# Patient Record
Sex: MALE | Race: WHITE | Employment: UNEMPLOYED | URBAN - METROPOLITAN AREA
[De-identification: names, ages, dates, MRNs, and addresses within clinical notes are randomized per-mention and may not be internally consistent; named-entity substitution may affect disease eponyms.]

---

## 2024-06-11 ENCOUNTER — HOSPITAL ENCOUNTER (EMERGENCY)
Age: 49
Discharge: HOME | End: 2024-06-11
Attending: PHYSICIAN ASSISTANT

## 2024-06-11 ENCOUNTER — APPOINTMENT (OUTPATIENT)
Dept: GENERAL RADIOLOGY | Age: 49
End: 2024-06-11

## 2024-06-11 VITALS
RESPIRATION RATE: 15 BRPM | SYSTOLIC BLOOD PRESSURE: 138 MMHG | OXYGEN SATURATION: 99 % | DIASTOLIC BLOOD PRESSURE: 80 MMHG | WEIGHT: 210 LBS | TEMPERATURE: 97.1 F | HEART RATE: 84 BPM | BODY MASS INDEX: 26.95 KG/M2 | HEIGHT: 74 IN

## 2024-06-11 DIAGNOSIS — M54.50 LOW BACK PAIN, UNSPECIFIED BACK PAIN LATERALITY, UNSPECIFIED CHRONICITY, UNSPECIFIED WHETHER SCIATICA PRESENT: ICD-10-CM

## 2024-06-11 DIAGNOSIS — M25.572 LEFT ANKLE PAIN, UNSPECIFIED CHRONICITY: Primary | ICD-10-CM

## 2024-06-11 PROCEDURE — 73610 X-RAY EXAM OF ANKLE: CPT

## 2024-06-11 PROCEDURE — 72100 X-RAY EXAM L-S SPINE 2/3 VWS: CPT

## 2024-06-11 PROCEDURE — 99283 EMERGENCY DEPT VISIT LOW MDM: CPT

## 2024-06-11 PROCEDURE — 73590 X-RAY EXAM OF LOWER LEG: CPT

## 2024-06-11 ASSESSMENT — LIFESTYLE VARIABLES
HOW OFTEN DO YOU HAVE A DRINK CONTAINING ALCOHOL: NEVER
HOW MANY STANDARD DRINKS CONTAINING ALCOHOL DO YOU HAVE ON A TYPICAL DAY: PATIENT DOES NOT DRINK

## 2024-06-11 ASSESSMENT — PAIN SCALES - GENERAL: PAINLEVEL_OUTOF10: 3

## 2024-06-11 ASSESSMENT — PAIN DESCRIPTION - LOCATION: LOCATION: BACK

## 2024-06-11 ASSESSMENT — PAIN - FUNCTIONAL ASSESSMENT: PAIN_FUNCTIONAL_ASSESSMENT: 0-10

## 2024-06-11 ASSESSMENT — PAIN DESCRIPTION - ORIENTATION: ORIENTATION: LOWER

## 2024-06-11 NOTE — ED PROVIDER NOTES
EMERGENCY DEPARTMENT ENCOUNTER        Pt Name: Pancho Wells  MRN: [id number]  Birthdate 1975  Date of evaluation: 6/11/2024  Provider: LITZY ABREU  PCP: Lauryn Luther MD    TOMMIE. I have evaluated this patient.        Triage CHIEF COMPLAINT       Chief Complaint   Patient presents with    Leg Pain     Left; pt reports chairs fell off a cart and crushed him underneath / happened a month ago / wants x ray of left leg above his ankle + back pain / reports occasionally having headaches as well    Foot Pain     Left; 1 month ago    Back Pain     Lower part    Head Injury     1 month ago.          HISTORY OF PRESENT ILLNESS      Chief Complaint: Left leg pain/medial ankle pain, low back pain    Pancho Wells is a 48 y.o. to the ED via private vehicle with a chief complaint of ongoing left medial ankle pain and low back pain.  Patient states that he was recently incarcerated and he was doing community service, male who presents he states that he was helping moving a large rack of chairs when his chair started falling in the rack then struck him and then pinned his left leg up against the wall.  He states that this happened over a month ago, has had worsening symptoms, was concerned that he had a fracture because states that he was unable to be seen at the time of the injury.  He has been ambulatory, no obvious bruising but just continued pain into the medial aspect of the ankle and back.  He has no other alarming symptoms related to the back, no bowel or bladder incontinence, saddle anesthesias radicular weakness.  No fevers or chills.    Nursing Notes were all reviewed and agreed with or any disagreements were addressed in the HPI.    REVIEW OF SYSTEMS     At least 10 systems reviewed and otherwise acutely negative except as in the [place].     PAST MEDICAL HISTORY   No past medical history on file.    SURGICAL HISTORY   No past surgical history on file.    CURRENTMEDICATIONS       Previous

## 2024-06-11 NOTE — DISCHARGE INSTR - COC
Continuity of Care Form    Patient Name: Pancho Wells   :  1975  MRN:  [id number]    Admit date:  2024  Discharge date:  ***    Code Status Order: No Order   Advance Directives:     Admitting Physician:  No admitting provider for patient encounter.  PCP: Lauryn Luther MD    Discharging Nurse: ***  Discharging Hospital Unit/Room#: DENN[facility]-02/DENNIS-2  Discharging Unit Phone Number: ***    Emergency Contact:   Extended Emergency Contact Information  Primary Emergency Contact: FAIZA KAHN  Home Phone: [phone]  Relation: Brother/Sister    Past Surgical History:  No past surgical history on file.    Immunization History:     There is no immunization history on file for this patient.    Active Problems:  Patient Active Problem List   Diagnosis Code    Lung nodules R91.8    SOB (shortness of breath) on exertion R06.02       Isolation/Infection:   Isolation            No Isolation          Patient Infection Status       None to display            Nurse Assessment:  Last Vital Signs: /80   Pulse 84   Temp 97.1 °F (36.2 °C) (Oral)   Resp 15   Ht 1.88 m (6' 2\")   Wt 95.3 kg (210 lb)   SpO2 99%   BMI 26.96 kg/m²     Last documented pain score (0-10 scale): Pain Level: 3  Last Weight:   Wt Readings from Last 1 Encounters:   24 95.3 kg (210 lb)     Mental Status:  {IP PT MENTAL STATUS:}    IV Access:  { CRISTIAN IV ACCESS:652755036}    Nursing Mobility/ADLs:  Walking   {CHP DME ADLs:078068980}  Transfer  {CHP DME ADLs:630156270}  Bathing  {CHP DME ADLs:458696944}  Dressing  {CHP DME ADLs:120326134}  Toileting  {CHP DME ADLs:591782631}  Feeding  {CHP DME ADLs:426441933}  Med Admin  {CHP DME ADLs:798278544}  Med Delivery   { CRISTIAN MED Delivery:582395820}    Wound Care Documentation and Therapy:        Elimination:  Continence:   Bowel: {YES / NO:}  Bladder: {YES / NO:}  Urinary Catheter: {Urinary Catheter:828568646}   Colostomy/Ileostomy/Ileal Conduit: {YES / NO:}       Date